# Patient Record
(demographics unavailable — no encounter records)

---

## 2024-11-19 NOTE — PHYSICAL EXAM
[Right] : right knee [NL (0)] : extension 0 degrees [] : patient ambulates without assistive device [TWNoteComboBox7] : flexion 115 degrees

## 2024-11-19 NOTE — HISTORY OF PRESENT ILLNESS
[de-identified] : 11/19/2024 Mr. STEVE HEARD, a 15 year old male (D, [facility], basketball/Volley ball), presents today, with dad in room,  for R knee pain since 11/18/24. States he collided with a player last night and caused he knee to hyper-extend. States pain is minimal today, but unable to straighten his knee fully and difficulty with ambulation. Denies PMH of knee injury.

## 2024-11-19 NOTE — PROCEDURE
[FreeTextEntry3] : Aspiration  was performed of the right knee. Aspiration was indicated due to effusion. The amount aspirated was 55 cc. The amount aspirated was blood. Patient tolerated procedure well.

## 2024-11-19 NOTE — DISCUSSION/SUMMARY
[de-identified] : 16yo male presents with right knee hyperextension injury with effusion   1) Will get MRI R knee to eval  2) Given playmaker brace and sleeve Brace was appropriately fitted and dispensed. Pt left the office with brace as stated above.  3) effusion of right knee - Aspirated 55cc bloody synovial fluid  4) Use of cryotherapy, anti-inflammatories for pain and inflammation  5) RTC after MRI to review results

## 2024-11-19 NOTE — IMAGING
[Right] : right knee [All Views] : anteroposterior, lateral, skyline, and anteroposterior standing [There are no fractures, subluxations or dislocations. No significant abnormalities are seen] : There are no fractures, subluxations or dislocations. No significant abnormalities are seen [FreeTextEntry9] : effusion above the patella

## 2024-11-26 NOTE — HISTORY OF PRESENT ILLNESS
[de-identified] : 11/26/24: Pt here to f/u R knee pain and review MRI taken 11/21/24.   11/19/2024 Mr. STEVE HEARD, a 15 year old male (D, [employer] [facility], basketball/Volley ball), presents today, with dad in room,  for R knee pain since 11/18/24. States he collided with a player last night and caused he knee to hyper-extend. States pain is minimal today, but unable to straighten his knee fully and difficulty with ambulation. Denies PMH of knee injury.

## 2024-11-26 NOTE — HISTORY OF PRESENT ILLNESS
[de-identified] : 11/26/24: Pt here to f/u R knee pain and review MRI taken 11/21/24.   11/19/2024 Mr. STEVE HEARD, a 15 year old male (D, [employer] [employer] [facility], basketball/Volley ball), presents today, with dad in room,  for R knee pain since 11/18/24. States he collided with a player last night and caused he knee to hyper-extend. States pain is minimal today, but unable to straighten his knee fully and difficulty with ambulation. Denies PMH of knee injury.

## 2024-11-26 NOTE — DISCUSSION/SUMMARY
[de-identified] : 16yo male presents with right knee hyperextension injury with MRI of the right knee showing bone contusions of the anterior medial / lateral tibial plateau and the anterior LFC.   The patient was advised of the diagnosis. The natural history of the pathology was explained in full to the patient in layman's terms. All questions were answered. The risks and benefits of surgical and non-surgical treatment alternatives were explained in full to the patient.   1) start physical therapy 2) c/w brace 3) cryotherapy, rest and activity modification  4) out of gym and sports 5) rtc 2 weeks   Entered by Lynette Garcia acting as scribe. Dr. Cr- The documentation recorded by the scribe accurately reflects the service I personally performed and the decisions made by me.

## 2024-11-26 NOTE — DATA REVIEWED
[MRI] : MRI [Right] : of the right [Knee] : knee [Report was reviewed and noted in the chart] : The report was reviewed and noted in the chart [I independently reviewed and interpreted images and report] : I independently reviewed and interpreted images and report [I reviewed the films/CD] : I reviewed the films/CD [FreeTextEntry1] : 11/21/24: recent hyperextension with bone contusions of the medial/lateral anterior tibial plateau and anterior LFC.

## 2024-11-26 NOTE — DISCUSSION/SUMMARY
[de-identified] : 14yo male presents with right knee hyperextension injury with MRI of the right knee showing bone contusions of the anterior medial / lateral tibial plateau and the anterior LFC.   The patient was advised of the diagnosis. The natural history of the pathology was explained in full to the patient in layman's terms. All questions were answered. The risks and benefits of surgical and non-surgical treatment alternatives were explained in full to the patient.   1) start physical therapy 2) c/w brace 3) cryotherapy, rest and activity modification  4) out of gym and sports 5) rtc 2 weeks   Entered by Lynette Garcia acting as scribe. Dr. Cr- The documentation recorded by the scribe accurately reflects the service I personally performed and the decisions made by me.

## 2024-12-10 NOTE — PHYSICAL EXAM
[Right] : right knee [NL (0)] : extension 0 degrees [NL (140)] : flexion 140 degrees [] : no pain with varus stress

## 2024-12-10 NOTE — HISTORY OF PRESENT ILLNESS
[] : yes [de-identified] : 12/10/24: Here to f/u R knee. Attending PT which he finds to be helpful, has seen good improvement. Minimal remaining pain complaints, most present with deep flexion.  11/26/24: Pt here to f/u R knee pain and review MRI taken 11/21/24.  11/19/2024 Mr. STEVE HEARD, a 15 year old male (D, [employer] [facility], basketball/Volley ball), presents today, with dad in room,  for R knee pain since 11/18/24. States he collided with a player last night and caused he knee to hyper-extend. States pain is minimal today, but unable to straighten his knee fully and difficulty with ambulation. Denies PMH of knee injury.  [FreeTextEntry1] : R knee

## 2024-12-10 NOTE — DISCUSSION/SUMMARY
[de-identified] : 14yo male presents with right knee hyperextension injury with MRI of the right knee showing bone contusions of the anterior medial / lateral tibial plateau and the anterior LFC.  Improved with PT.  The patient was advised of the diagnosis. The natural history of the pathology was explained in full to the patient in layman's terms. All questions were answered. The risks and benefits of surgical and non-surgical treatment alternatives were explained in full to the patient.   1) c/w physical therapy 2) c/w brace prn  3) cryotherapy, rest and activity modification  4) clear to return to gym and sports as tolerated 5) discussed gradual return to and increase with activity as tolerated 6) rtc 4 weeks   Entered by Lynette Garcia acting as scribe. Dr. Cr- The documentation recorded by the scribe accurately reflects the service I personally performed and the decisions made by me.

## 2025-01-16 NOTE — DISCUSSION/SUMMARY
[de-identified] : 14yo male presents with right knee hyperextension injury with MRI of the right knee showing bone contusions of the anterior medial / lateral tibial plateau and the anterior LFC. Has been back to basketball since beginning of December with no issues   1) complete PT and c/w hep  2) beginning to develop patellar tendinitis - advised patella tendon strap and voltaren gel sent to pharmacy - side effects reviewed - instructed how to apply  2) cryotherapy and activity modification  3) clear to return to gym and sports as tolerated, no restrictions  4) rtc prn   Entered by Lynette Garcia acting as scribe. Dr. Cr- The documentation recorded by the scribe accurately reflects the service I personally performed and the decisions made by me.

## 2025-01-16 NOTE — DISCUSSION/SUMMARY
[de-identified] : 16yo male presents with right knee hyperextension injury with MRI of the right knee showing bone contusions of the anterior medial / lateral tibial plateau and the anterior LFC. Has been back to basketball since beginning of December with no issues   1) complete PT and c/w hep  2) beginning to develop patellar tendinitis - advised patella tendon strap and voltaren gel sent to pharmacy - side effects reviewed - instructed how to apply  2) cryotherapy and activity modification  3) clear to return to gym and sports as tolerated, no restrictions  4) rtc prn   Entered by Lynette Garcia acting as scribe. Dr. Cr- The documentation recorded by the scribe accurately reflects the service I personally performed and the decisions made by me.

## 2025-01-16 NOTE — PHYSICAL EXAM
[Right] : right knee [NL (140)] : flexion 140 degrees [NL (0)] : extension 0 degrees [] : non-antalgic

## 2025-01-16 NOTE — HISTORY OF PRESENT ILLNESS
[] : yes [de-identified] : 1/14/25: Here to f/u R knee. Has discontinued PT. Playing basketball, no complaints.  12/10/24: Here to f/u R knee. Attending PT which he finds to be helpful, has seen good improvement. Minimal remaining pain complaints, most present with deep flexion.  11/26/24: Pt here to f/u R knee pain and review MRI taken 11/21/24.  11/19/2024 Mr. STEVE HEARD, a 15 year old male (D, [facility], basketball/Volley ball), presents today, with dad in room,  for R knee pain since 11/18/24. States he collided with a player last night and caused he knee to hyper-extend. States pain is minimal today, but unable to straighten his knee fully and difficulty with ambulation. Denies PMH of knee injury.  [FreeTextEntry1] : R knee

## 2025-01-16 NOTE — HISTORY OF PRESENT ILLNESS
[] : yes [de-identified] : 1/14/25: Here to f/u R knee. Has discontinued PT. Playing basketball, no complaints.  12/10/24: Here to f/u R knee. Attending PT which he finds to be helpful, has seen good improvement. Minimal remaining pain complaints, most present with deep flexion.  11/26/24: Pt here to f/u R knee pain and review MRI taken 11/21/24.  11/19/2024 Mr. STEVE HEARD, a 15 year old male (D, [facility], basketball/Volley ball), presents today, with dad in room,  for R knee pain since 11/18/24. States he collided with a player last night and caused he knee to hyper-extend. States pain is minimal today, but unable to straighten his knee fully and difficulty with ambulation. Denies PMH of knee injury.  [FreeTextEntry1] : R knee